# Patient Record
Sex: FEMALE | Race: AMERICAN INDIAN OR ALASKA NATIVE
[De-identification: names, ages, dates, MRNs, and addresses within clinical notes are randomized per-mention and may not be internally consistent; named-entity substitution may affect disease eponyms.]

---

## 2018-06-22 ENCOUNTER — HOSPITAL ENCOUNTER (EMERGENCY)
Dept: HOSPITAL 7 - FB.ED | Age: 68
Discharge: HOME | End: 2018-06-22
Payer: MEDICARE

## 2018-06-22 DIAGNOSIS — S93.402A: Primary | ICD-10-CM

## 2018-06-22 DIAGNOSIS — E66.3: ICD-10-CM

## 2018-06-22 DIAGNOSIS — Z91.041: ICD-10-CM

## 2018-06-22 DIAGNOSIS — F32.9: ICD-10-CM

## 2018-06-22 DIAGNOSIS — I10: ICD-10-CM

## 2018-06-22 DIAGNOSIS — M79.7: ICD-10-CM

## 2018-06-22 DIAGNOSIS — K21.9: ICD-10-CM

## 2018-06-22 DIAGNOSIS — M94.0: ICD-10-CM

## 2018-06-22 DIAGNOSIS — S20.212A: ICD-10-CM

## 2018-06-22 DIAGNOSIS — Z88.8: ICD-10-CM
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
## 2018-06-25 NOTE — ER
DATE SEEN:  06/22/2018

 

CHIEF COMPLAINT:  Fall.

 

HISTORY OF PRESENT ILLNESS:  This 68-year-old  woman presents with a

history of walking a dog.  The dog tripped her and she fell on her left chest

and sprained her left ankle, and has left tib-fib discomfort.

 

She denies shortness of breath or cough or chest pain except for the mild left

axillary region discomfort.  The patient is a 20-pack-year smoker (half a pack

per day for 40 years), has hypertension, status post bilateral bunionectomy,

hammertoe surgery, and compromised left foot MP joint with instability secondary

to surgery, is edentulous, has fibromyalgia, GERD, bladder spasm, denies

drinking alcohol, and has ulcer disease.

 

ALLERGIES:  Gabapentin, IVP dye.

 

MEDICATIONS:  None.

 

REVIEW OF SYSTEMS:  Otherwise negative except as noted in HPI and past medical

history.

 

PHYSICAL EXAMINATION:  VITAL SIGNS:  Blood pressure 116/61, heart rate 93,

respirations 18, oxygen saturation 93% (low normal), and temperature 37.4

degrees centigrade.  The 93% may be her normal as she has chronic obstructive

lung disease from smoking.  BMI is 28.9 kg/m2, 67.1 kg.  GENERAL:  Alert woman,

who is edentulous, is in mild distress.  She is accompanied by daughter.  HEENT:

PERRLA intact.  Pharynx without abnormality except for edentulous appearance.

Uvula and tongue are midline.  Speech appropriate.  NECK:  No bruits.  No

thyromegaly.  No masses in the neck.  No murmurs.  No tracheal tug.  No tracheal

deviation.  LUNGS:  Clear without rales, rhonchi, or wheezes.  HEART:  S1, S2.

No murmur.  Chest wall mild tenderness of the left 11th, 10th, 8th, 7th, and 
6th ribs.

There is reproducible pain at the midclavicular line and the

anterior axillary line and also the sternal chondral joints.  No ecchymosis,

swelling, or laceration noted in the chest wall.  ABDOMEN:  Soft.  No

hepatosplenomegaly.  No guarding.  No abdominal discomfort.  Bowel sounds

normal.  Palpation of the thoracic spine demonstrates  pain from

T3 down to L5.  She states she has fibromyalgia and also states she has chronic

low back and upper back pain, has more upper back pain today than usual.

Today,she attributes the back to her baseline that she always has back pain and

"arthritis."  NEUROLOGIC:  Deep tendon reflexes normal upper and lower

extremities.  Cranial nerves 2 through 12 intact.  Gait appropriate and oriented

x3.

LEFT ANKLE:  there is no swelling of the ankle, there is an abrasion of the 
ankle dermis.  Inversion and

eversion are without abnormality and no abnormality in the drawer sign.  No

proximal 5th metatarsal pain or pain of the metatarsals or phalanges of the

toes.

 

X-ray of her knee, left tib-fib, and left ankle are negative.  No fracture.

There is mild osteoporosis.  The patient noted she has had a DEXA scan last week

and it was a negative number, which means she does have osteoporosis. This 
confirms my

impression of osteoporosis.  Rib x-ray did not reveal fracture.

 

ASSESSMENT:  Soft tissue contusion left ribs with traumatic sternal chondritis,

costochondritis left chest, left ankle sprain and abrasion.

 

 

DIAGNOSES:

1. Left chest wall trauma with mild costochondritis, sternal chondritis, left

    ribs 6th through 11th.

2. Left tib-fib contusion, left ankle sprain, mild and 4 abrasions to the

    lower extremities.  The patient will follow up next week and gradually

    progress or increase activity as tolerated.Because of her age and BEERS

    criteria, no narcotics were given to the patient.  The patient advised the

    reason for not using narcotics.  She felt comfortable with this plan.

    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

 

OTHER DIAGNOSES:

1. Fibromyalgia, edentulous.

2. Mild shortness of breath secondary to left chest contusion.

3. Gastroesophageal reflux disease.

4. Bladder spasm, relieved with oxybutynin.

5. Depression.

6. Hypertension.

7. Mildly overweight.

8. History of gastric bypass and bunion surgery.

 

The patient to follow up with doctor next week.

 

Job#: [id number]

DD: 06/22/2018 1717

DT: 06/23/2018 0029 MAR/WINSTON MALLOY

## 2018-06-25 NOTE — CR
INDICATION:  Fall/trauma left ribs.



LEFT RIBS WITH CHEST:  

CHEST:  PA view of the chest was obtained 06/22/2018 and compared with 09/21/
2009, revealing increased calcification in the arch of the aorta with minimally 
increased tortuosity of the aorta.  The heart remains normal in size and shape.
  



An active infiltrate, effusion, contusion, or pneumothorax was not identified.  



IMPRESSION:  No acute process - progressive ASD aorta.



LEFT RIBS:  Three views of the left ribs were obtained 06/22/2018 and compared 
with 09/21/2009.  



Laterally at the 5th and 6th ribs, there is slight deformity, compatible with 
old healed fracture sites present on the previous examination.  



An acute fracture or other definite bony abnormality of the ribs was not 
identified.  



IMPRESSION:

1.  No acute fracture site identified.

2.  Two old healed fracture sites again noted.  

MTDD

## 2018-06-25 NOTE — CR
INDICATION:  Trauma to leg, dog knocked down.



LEFT TIBIA/FIBULA:  Frontal and lateral views of the left tibia and fibula 
revealed minimal degenerative change at the medial intercondylar spine.



The ankle mortise and knee were otherwise unremarkable with no evidence of a 
fracture or dislocation identified.  

MTDD

## 2020-04-30 NOTE — EDM.PDOC
ED HPI GENERAL MEDICAL PROBLEM





- General


Chief Complaint: Back Pain or Injury


Time Seen by Provider: 04/30/20 12:00


Source of Information: Reports: Patient


History Limitations: Reports: No Limitations





- History of Present Illness


INITIAL COMMENTS - FREE TEXT/NARRATIVE: 





Patient presented to the ED because of a chronic neck and left shoulder pain. 

She has a history of OA and is taking tylneol for pain without any significant 

relief. Her friend gave her tramadol which she took and helped with her pain. 

Her pain is 5/10.


  ** Lower back radiating into R knee, L shoulder


Pain Score (Numeric/FACES): 10





- Related Data


 Allergies











Allergy/AdvReac Type Severity Reaction Status Date / Time


 


gabapentin Allergy  Confusion Verified 04/30/20 12:00


 


Iodinated Contrast Media Allergy  Burning Verified 04/30/20 12:00





[Iodinated Contrast- Oral     





and IV Dye]     











Home Meds: 


 Home Meds





Aspirin 81 mg PO DAILY 06/22/18 [History]


Metoprolol Succinate 25 mg PO DAILY 06/22/18 [History]


busPIRone [Buspar] 5 mg PO TID 06/22/18 [History]


Folic Acid 1 mg DAILY 04/30/20 [History]


Pantoprazole Sodium [Protonix] 40 mg PO BID 04/30/20 [History]


Sucralfate 1 gm QID 04/30/20 [History]


Venlafaxine HCl [Venlafaxine ER] 300 mg DAILY 04/30/20 [History]


tiZANidine 2 mg PO BEDTIME 04/30/20 [History]


tiZANidine 2 mg PO Q8H PRN #30 cap 04/30/20 [Rx]


traMADol HCl [Tramadol HCl] 100 mg PO Q8H PRN #30 tablet 04/30/20 [Rx]











Past Medical History


Cardiovascular History: Reports: Hypertension


Gastrointestinal History: Reports: Gastritis, GERD, GI Bleed, PUD


Genitourinary History: Reports: Urinary Incontinence


Other Genitourinary History: stress incont.


Other OB/GYN History: G0


Musculoskeletal History: Reports: Arthritis, Back Pain, Chronic, Fracture, 

Fibromyalgia, Osteoarthritis


Other Musculoskeletal History: hx fx R forearm & wrist,


Psychiatric History: Reports: Addiction, Anxiety, Depression, Panic Attack


Other Psychiatric History: hx ETOH abuse, recovering x past 4 years


Endocrine/Metabolic History: Reports: Obesity/BMI 30+


Hematologic History: Reports: Anemia, Blood Transfusion(s)





- Infectious Disease History


Infectious Disease History: Reports: Chicken Pox, Measles, Mumps, Shingles





- Past Surgical History


Cardiovascular Surgical History: Reports: None


GI Surgical History: Reports: Appendectomy, Bariatric Procedure, Cholecystectomy

, Colonoscopy, EGD, Hernia Repair/Other


Female  Surgical History: Reports: Hysterectomy, Salpingo-Oophorectomy


Neurological Surgical History: Reports: Spinal Fusion


Other Neurological Surgeries/Procedures: spinal fusion x 2


Musculoskeletal Surgical History: Reports: None, Other (See Below)


Other Musculoskeletal Surgeries/Procedures:: back surgery/fusion x 2





Social & Family History





- Family History


Family Medical History: Noncontributory





- Tobacco Use


Smoking Status *Q: Current Every Day Smoker


Years of Tobacco use: 50


Packs/Tins Daily: 0.2





- Caffeine Use


Caffeine Use: Reports: Coffee, Soda





- Recreational Drug Use


Recreational Drug Use: No





ED ROS GENERAL





- Review of Systems


Review Of Systems: See Below


Constitutional: Reports: No Symptoms


HEENT: Reports: No Symptoms


Respiratory: Reports: No Symptoms


Cardiovascular: Reports: No Symptoms


Endocrine: Reports: No Symptoms


GI/Abdominal: Reports: No Symptoms


: Reports: No Symptoms


Musculoskeletal: Reports: Shoulder Pain, Muscle Stiffness


Skin: Reports: No Symptoms


Neurological: Reports: No Symptoms


Psychiatric: Reports: No Symptoms


Hematologic/Lymphatic: Reports: No Symptoms





ED EXAM,LOWER BACK PAIN/INJURY





- Physical Exam


Exam: See Below


Exam Limited By: No Limitations


General Appearance: Alert, No Apparent Distress


Eye Exam: Bilateral Eye: PERRL


Ears: Normal External Exam, Normal Canal


Nose: Normal Inspection, Normal Mucosa, No Blood


Throat/Mouth: Normal Inspection, Normal Lips, Normal Teeth


Head: Atraumatic, Normocephalic


Neck: Normal Inspection, Supple, Non-Tender, Full Range of Motion


Respiratory/Chest: No Respiratory Distress, Lungs Clear, Normal Breath Sounds


Cardiovascular: Normal Peripheral Pulses, Regular Rate, Rhythm, No Edema, No 

Gallop


GI/Abdominal: Normal Bowel Sounds, Soft, Non-Tender, No Organomegaly


Back Exam: Normal Inspection, Full Range of Motion


Extremities: Normal Inspection, Normal Range of Motion, Other (tenderness left 

shoulder.)





Course





- Vital Signs


Text/Narrative:: 





tramadol 100 mg po x1


tylenol 1000 mg po x1


Last Recorded V/S: 


 Last Vital Signs











Temp  36.9 C   04/30/20 11:55


 


Pulse  67   04/30/20 13:00


 


Resp  18   04/30/20 13:00


 


BP  152/63 H  04/30/20 13:00


 


Pulse Ox  100   04/30/20 13:00














- Orders/Labs/Meds


Meds: 


Medications














Discontinued Medications














Generic Name Dose Route Start Last Admin





  Trade Name Brentq  PRN Reason Stop Dose Admin


 


Acetaminophen  1,000 mg  04/30/20 12:27  04/30/20 12:50





  Tylenol Extra Strength  PO  04/30/20 12:28  1,000 mg





  ONETIME ONE   Administration





     





     





     





     


 


Cyclobenzaprine HCl  10 mg  04/30/20 12:27  04/30/20 12:50





  Flexeril  PO  04/30/20 12:28  10 mg





  ONETIME ONE   Administration





     





     





     





     


 


Tramadol HCl  100 mg  04/30/20 12:27  04/30/20 12:50





  Ultram  PO  04/30/20 12:28  100 mg





  ONETIME ONE   Administration





     





     





     





     














Departure





- Departure


Time of Disposition: 13:30


Disposition: Home, Self-Care 01


Condition: Good


Clinical Impression: 


 Chronic pain, Osteoarthritis








- Discharge Information


Prescriptions: 


tiZANidine 2 mg PO Q8H PRN #30 cap


 PRN Reason: Muscle Spasm


traMADol HCl [Tramadol HCl] 100 mg PO Q8H PRN #30 tablet


 PRN Reason: Pain


Instructions:  Cyclobenzaprine tablets, Osteoarthritis, Tramadol tablets, 

Chronic Pain, Adult


Referrals: 


Enma Shook, NP [Primary Care Provider] - 


Forms:  ED Department Discharge


Additional Instructions: 


please read discharge instructions on chronic pain and osteoarthritis


take the following medicines all at the same time for better pan relief


1.Tizanidine 2mg every 8 hours as needed for muscle spasm


2.Tramadol 100 mg with tylenol 1000 mg every 8 hours as needed for pain


Follow up as needed





Sepsis Event Note





- Evaluation


Sepsis Screening Result: No Definite Risk





- Focused Exam


Vital Signs: 


 Vital Signs











  Temp Pulse Resp BP Pulse Ox


 


 04/30/20 13:00   67  18  152/63 H  100


 


 04/30/20 11:55  36.9 C  50 L  18  133/71  99











Date Exam was Performed: 04/30/20


Time Exam was Performed: 22:55

## 2021-03-03 NOTE — EDM.PDOC
ED HPI GENERAL MEDICAL PROBLEM





- General


Stated Complaint: FLANK PAIN


Time Seen by Provider: 03/03/21 17:10


Source of Information: Reports: Patient


History Limitations: Reports: No Limitations





- History of Present Illness


INITIAL COMMENTS - FREE TEXT/NARRATIVE: 





Patient presented to the ED because of left flank pain which started at 0600. 

The pain is sharp, 10/10. There is no associated nausea,vomiting, fever, chills.

She has a history of kidney stone many years ago and was surgically removed.


  ** left flank pain


Pain Score (Numeric/FACES): 10





- Related Data


                                    Allergies











Allergy/AdvReac Type Severity Reaction Status Date / Time


 


gabapentin Allergy  Confusion Verified 04/30/20 12:00


 


Iodinated Contrast Media Allergy  Burning Verified 04/30/20 12:00





[Iodinated Contrast- Oral     





and IV Dye]     











Home Meds: 


                                    Home Meds





Aspirin 81 mg PO DAILY 06/22/18 [History]


Metoprolol Succinate 25 mg PO DAILY 06/22/18 [History]


busPIRone [Buspar] 5 mg PO TID 06/22/18 [History]


Folic Acid 1 mg DAILY 04/30/20 [History]


Pantoprazole Sodium [Protonix] 40 mg PO BID 04/30/20 [History]


Sucralfate 1 gm QID 04/30/20 [History]


Venlafaxine HCl [Venlafaxine ER] 300 mg DAILY 04/30/20 [History]


tiZANidine 2 mg PO BEDTIME 04/30/20 [History]


tiZANidine 2 mg PO Q8H PRN #30 cap 04/30/20 [Rx]


traMADol HCl [Tramadol HCl] 100 mg PO Q8H PRN #30 tablet 04/30/20 [Rx]











Past Medical History


Cardiovascular History: Reports: Hypertension


Gastrointestinal History: Reports: Gastritis, GERD, GI Bleed, PUD


Genitourinary History: Reports: Urinary Incontinence


Other Genitourinary History: stress incont.


Other OB/GYN History: G0


Musculoskeletal History: Reports: Arthritis, Back Pain, Chronic, Fracture, 

Fibromyalgia, Osteoarthritis


Other Musculoskeletal History: hx fx R forearm & wrist,


Psychiatric History: Reports: Addiction, Anxiety, Depression, Panic Attack


Other Psychiatric History: hx ETOH abuse, recovering x past 4 years


Endocrine/Metabolic History: Reports: Obesity/BMI 30+


Hematologic History: Reports: Anemia, Blood Transfusion(s)





- Infectious Disease History


Infectious Disease History: Reports: Chicken Pox, Measles, Mumps, Shingles





- Past Surgical History


Cardiovascular Surgical History: Reports: None


GI Surgical History: Reports: Appendectomy, Bariatric Procedure, 

Cholecystectomy, Colonoscopy, EGD, Hernia Repair/Other


Female  Surgical History: Reports: Hysterectomy, Salpingo-Oophorectomy


Neurological Surgical History: Reports: Spinal Fusion


Other Neurological Surgeries/Procedures: spinal fusion x 2


Musculoskeletal Surgical History: Reports: None, Other (See Below)


Other Musculoskeletal Surgeries/Procedures:: back surgery/fusion x 2





Social & Family History





- Family History


Family Medical History: No Pertinent Family History





- Caffeine Use


Caffeine Use: Reports: Coffee, Soda





ED ROS GENERAL





- Review of Systems


Review Of Systems: See Below


Constitutional: Reports: No Symptoms


HEENT: Reports: No Symptoms


Respiratory: Reports: No Symptoms


Cardiovascular: Reports: No Symptoms


Endocrine: Reports: No Symptoms


GI/Abdominal: Reports: No Symptoms


: Reports: Flank Pain


Musculoskeletal: Reports: No Symptoms


Skin: Reports: No Symptoms





ED EXAM, GI/ABD





- Physical Exam


Exam: See Below


Exam Limited By: No Limitations


General Appearance: Alert, No Apparent Distress


Ears: Normal External Exam, Normal Canal, Hearing Grossly Normal


Nose: Normal Inspection, Normal Mucosa


Throat/Mouth: Normal Inspection, Normal Lips, Normal Teeth


Head: Atraumatic, Normocephalic


Neck: Normal Inspection, Supple, Non-Tender, Full Range of Motion


Respiratory/Chest: No Respiratory Distress, Lungs Clear, Normal Breath Sounds, 

No Accessory Muscle Use, Chest Non-Tender


Cardiovascular: Normal Peripheral Pulses, Regular Rate, Rhythm, No Edema, No 

Gallop, No JVD, No Murmur


GI/Abdominal Exam: Normal Bowel Sounds, Soft, No Organomegaly, Other (left CVAT)


Back Exam: Normal Inspection, Full Range of Motion


Extremities: Normal Inspection, Normal Range of Motion, Non-Tender, No Pedal 

Edema, Normal Capillary Refill





Course





- Vital Signs


Text/Narrative:: 





Labs/Abd-pelvis CT


NS 1 L bolus


Morphine 2 mg IV x1


Last Recorded V/S: 


                                Last Vital Signs











Temp  37.2 C   03/03/21 17:04


 


Pulse  87   03/03/21 17:04


 


Resp  18   03/03/21 17:04


 


BP  158/76 H  03/03/21 17:04


 


Pulse Ox  100   03/03/21 17:04














- Orders/Labs/Meds


Orders: 


                               Active Orders 24 hr











 Category Date Time Status


 


 CULTURE URINE [RM] Stat Lab  03/03/21 18:43 Ordered


 


 Sodium Chloride 0.9% [Normal Saline] 1,000 ml Med  03/03/21 17:15 Active





 IV ASDIRECTED   


 


 Sodium Chloride 0.9% [Saline Flush] Med  03/03/21 17:11 Active





 10 ml FLUSH ASDIRECTED PRN   


 


 Saline Lock Insert [OM.PC] Routine Oth  03/03/21 17:11 Ordered








                                Medication Orders





Sodium Chloride (Normal Saline)  1,000 mls @ 999 mls/hr IV ASDIRECTED ASHLEY


   Last Admin: 03/03/21 17:34  Dose: 999 mls/hr


   Documented by: [email or web address]


Sodium Chloride (Saline Flush)  10 ml FLUSH ASDIRECTED PRN


   PRN Reason: Keep Vein Open


   Last Admin: 03/03/21 17:35  Dose: 10 ml


   Documented by: [email or web address]








Labs: 


                                Laboratory Tests











  03/03/21 03/03/21 03/03/21 Range/Units





  17:20 17:20 18:20 


 


WBC  5.4    (3.0-10.3)  x10-3/uL


 


RBC  4.24    (3.60-5.20)  x10(6)uL


 


Hgb  11.9    (11.4-15.5)  g/dL


 


Hct  37.8    (34.2-48.2)  %


 


MCV  89.1    (76.7-100.5)  fL


 


MCH  28.0    (23.9-33.9)  pg


 


MCHC  31.5 L    (31.9-34.8)  g/dL


 


RDW  15.0    (12.3-16.5)  %


 


Plt Count  250    (151-488)  x10(3)uL


 


MPV  8.2    (7.1-12.4)  fL


 


Neut % (Auto)  42.3    (30.8-76.2)  %


 


Lymph % (Auto)  44.0    (18.4-52.1)  %


 


Mono % (Auto)  8.7    (4.4-15.7)  %


 


Eos % (Auto)  3.9    (0.6-8.1)  %


 


Baso % (Auto)  1.1    (0.2-1.5)  %


 


Neut # (Auto)  2.3    (1.5-6.3)  x10-3/uL


 


Lymph # (Auto)  2.4    (1.0-4.4)  x10-3/uL


 


Mono # (Auto)  0.5    (0.3-1.0)  x10-3/uL


 


Eos # (Auto)  0.2    (0.0-0.8)  x10-3/uL


 


Baso # (Auto)  0.1    (0.0-0.1)  x10-3/uL


 


Sodium   140   (135-145)  mmol/L


 


Potassium   4.3   (3.5-5.3)  mmol/L


 


Chloride   104   (100-110)  mmol/L


 


Carbon Dioxide   28   (21-32)  mmol/L


 


BUN   15   (7-18)  mg/dL


 


Creatinine   1.1 H   (0.55-1.02)  mg/dL


 


Est Cr Clr Drug Dosing   TNP   


 


Estimated GFR (MDRD)   49 L   (>60)  


 


BUN/Creatinine Ratio   13.6   (9-20)  


 


Glucose   100   ()  mg/dL


 


Calcium   8.9   (8.6-10.2)  mg/dL


 


Urine Color    Yellow  (YELLOW)  


 


Urine Appearance    Clear  (CLEAR)  


 


Urine pH    7.0 H  (5.0-6.5)  


 


Ur Specific Gravity    1.005 L  (1.010-1.025)  


 


Urine Protein    Negative  (NEGATIVE)  mg/dL


 


Urine Glucose (UA)    Normal  (NORMAL)  mg/dL


 


Urine Ketones    Negative  (NEGATIVE)  mg/dL


 


Urine Occult Blood    Negative  (NEGATIVE)  


 


Urine Nitrite    Negative  (NEGATIVE)  


 


Urine Bilirubin    Negative  (NEGATIVE)  


 


Urine Urobilinogen    Normal  (NEGATIVE)  mg/dL


 


Ur Leukocyte Esterase    Moderate H  (NEGATIVE)  


 


Urine RBC    0-5  (0-5)  


 


Urine WBC    0-5  (0-5)  


 


Ur Squamous Epith Cells    Occasional  (NS,R,O)  


 


Urine Bacteria    Few H  (NS)  











Meds: 


Medications











Generic Name Dose Route Start Last Admin





  Trade Name Freq  PRN Reason Stop Dose Admin


 


Sodium Chloride  1,000 mls @ 999 mls/hr  03/03/21 17:15  03/03/21 17:34





  Normal Saline  IV   999 mls/hr





  ASDIRECTED ASHLEY   Administration


 


Sodium Chloride  10 ml  03/03/21 17:11  03/03/21 17:35





  Saline Flush  FLUSH   10 ml





  ASDIRECTED PRN   Administration





  Keep Vein Open  














Discontinued Medications














Generic Name Dose Route Start Last Admin





  Trade Name Freq  PRN Reason Stop Dose Admin


 


Morphine Sulfate  2 mg  03/03/21 17:12  03/03/21 17:34





  Morphine  IVPUSH  03/03/21 17:13  2 mg





  ONETIME ONE   Administration














Departure





- Departure


Time of Disposition: 18:45


Disposition: Home, Self-Care 01


Condition: Good


Clinical Impression: 


 UTI (urinary tract infection), Chronic low back pain, DDD (degenerative disc 

disease)








- Discharge Information


Instructions:  Urinary Tract Infection, Adult, Easy-to-Read


Additional Instructions: 


Please read discharge instructions on UTI and Chronic low back pain


Increase oral fluids


Bactrim DS twice daily for 3 days


Follow up as needed





Sepsis Event Note (ED)





- Focused Exam


Vital Signs: 


                                   Vital Signs











  Temp Pulse Resp BP Pulse Ox


 


 03/03/21 17:04  37.2 C  87  18  158/76 H  100














- My Orders


Last 24 Hours: 


My Active Orders





03/03/21 17:11


Sodium Chloride 0.9% [Saline Flush]   10 ml FLUSH ASDIRECTED PRN 


Saline Lock Insert [OM.PC] Routine 





03/03/21 17:15


Sodium Chloride 0.9% [Normal Saline] 1,000 ml IV ASDIRECTED 





03/03/21 18:43


CULTURE URINE [RM] Stat 














- Assessment/Plan


Last 24 Hours: 


My Active Orders





03/03/21 17:11


Sodium Chloride 0.9% [Saline Flush]   10 ml FLUSH ASDIRECTED PRN 


Saline Lock Insert [OM.PC] Routine 





03/03/21 17:15


Sodium Chloride 0.9% [Normal Saline] 1,000 ml IV ASDIRECTED 





03/03/21 18:43


CULTURE URINE [RM] Stat

## 2021-03-03 NOTE — CT
INDICATION:  Left flank pain.  History of kidney stone.  



CT ABDOMEN AND PELVIS WITHOUT CONTRAST:  Spiral 2.5 mm axial sections were 
obtained through the abdomen and pelvis with sagittal and coronal 
reconstructions without IV contrast 03/03/21 - comparison 10/29/13.  

Total exam DLP was 1088.15 mGy-cm.  



Minimal scarring is again suggested at the lingula with no active infiltrate or 
effusion suggested.  



A moderate size fixed hiatal hernia is again noted with evidence of gastric 
bypass surgery noted.  



The heart did not appear enlarged. 

No pericardial effusion was seen. 



The gallbladder is absent, compatible with history of its removal.  



The liver, adrenal glands, spleen, and pancreas appear to be normal.  



No retroperitoneal mass was noted.  



Evidence of hernia surgery is noted in the lower abdomen/pelvis.  



The appendix is absent, compatible with history of its removal. 



No evidence of free air or bowel obstruction was identified.  



The uterus is absent, compatible with history of its removal. 



Thickening of the wall of the urinary bladder is noted, which may be on the 
basis of cystitis, but should be correlated clinically.  



No definite hernia was identified.  



No evidence of renal calcinosis or obstructive uropathy was identified.  Mild 
renal cortical scarring is noted.  No definite renal mass was demonstrated.  



Fusion is again noted with rods and pedicle screws at L3 through L5 and appears 
fairly stable. 



There is again noted retrolisthesis grade 1-2 at the L2-3 level with sclerosis, 
narrowed disk space and vacuum disk phenomenon again noted and appearing 
moderately progressive.  



Additionally, there is now a new finding of degenerative hypertrophic changes 
with sclerosis, subchondral cystic changes and narrowing of disk space at L1-2 
with vacuum disk phenomenon also noted - progressive osteoarthritis and disk 
disease at L1-2 is also seen.  



No organomegaly, mass lesions or free fluid collections were identified, except 
as noted above.  



IMPRESSION:

1.  Progressive degenerative changes and disk disease L1-2 and L2-3 with stable-
appearing fusion at L3 through L5.  

2.  Thickening of the urinary bladder wall, which may represent cystitis, but 
should be correlated clinically.

3.   Postcholecystectomy, postappendectomy, posthysterectomy, post gastric 
bypass surgery.  

4.  Moderate size fixed hiatal hernia again noted.  

5.  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  

6.  No evidence of obstructive uropathy or renal masses.  However, there is 
evidence of renal cortical scarring at the kidneys, most severe at the upper 
pole posteriorly of the left kidney.  



Report was called to Dr. Salguero at 1758 hours.  

[facility]

## 2022-01-11 NOTE — EDM.PDOC
ED HPI GENERAL MEDICAL PROBLEM





- General


Stated Complaint: HIGH POTASSIUM


Time Seen by Provider: 01/11/22 21:25


Source of Information: Reports: Patient





- History of Present Illness


INITIAL COMMENTS - FREE TEXT/NARRATIVE: 


71-year-old lady was seen by her primary care physician earlier today and found 

to have an elevated potassium at 6.0.  The provider called and let us know that 

she may be coming to the emergency room tonight because of this finding and 

thinks that there may have been some hemolyzed station of the potassium.  She 

states that she has absolutely no acute complaints and specifically denies 

fever, chills, flulike symptoms, chest pain, shortness of breath, arthralgias, 

myalgias, change in bowel or bladder habits.  She does have chronic orthopedic 

pain/complaints.





  ** Generalized


Pain Score (Numeric/FACES): 8





- Related Data


                                    Allergies











Allergy/AdvReac Type Severity Reaction Status Date / Time


 


gabapentin Allergy  Confusion Verified 01/11/22 21:29


 


Iodinated Contrast Media Allergy  Burning Verified 01/11/22 21:29





[Iodinated Contrast- Oral     





and IV Dye]     











Home Meds: 


                                    Home Meds





Aspirin 81 mg PO DAILY 06/22/18 [History]


Metoprolol Succinate 25 mg PO DAILY 06/22/18 [History]


busPIRone [Buspar] 5 mg PO TID 06/22/18 [History]


Folic Acid 1 mg DAILY 04/30/20 [History]


Pantoprazole Sodium [Protonix] 40 mg PO BID 04/30/20 [History]


Sucralfate 1 gm QID 04/30/20 [History]


Venlafaxine HCl [Venlafaxine ER] 300 mg DAILY 04/30/20 [History]


tiZANidine 2 mg PO BEDTIME 04/30/20 [History]


tiZANidine 2 mg PO Q8H PRN #30 cap 04/30/20 [Rx]


traMADol HCl [Tramadol HCl] 100 mg PO Q8H PRN #30 tablet 04/30/20 [Rx]











Past Medical History


Cardiovascular History: Reports: Hypertension


Gastrointestinal History: Reports: Gastritis, GERD, GI Bleed, PUD


Genitourinary History: Reports: Urinary Incontinence, Other (See Below)


Other Genitourinary History: stress incont.


Other OB/GYN History: G0


Musculoskeletal History: Reports: Arthritis, Back Pain, Chronic, Fracture, 

Fibromyalgia, Osteoarthritis


Other Musculoskeletal History: hx fx R forearm & wrist,


Psychiatric History: Reports: Addiction, Anxiety, Depression, Panic Attack


Other Psychiatric History: hx ETOH abuse, recovering x past 4 years


Endocrine/Metabolic History: Reports: Obesity/BMI 30+


Hematologic History: Reports: Anemia, Blood Transfusion(s)





- Infectious Disease History


Infectious Disease History: Reports: Chicken Pox, Measles, Mumps, Shingles





- Past Surgical History


Cardiovascular Surgical History: Reports: None


GI Surgical History: Reports: Appendectomy, Bariatric Procedure, 

Cholecystectomy, Colonoscopy, EGD, Hernia Repair/Other


Female  Surgical History: Reports: Hysterectomy, Salpingo-Oophorectomy


Neurological Surgical History: Reports: Spinal Fusion


Other Neurological Surgeries/Procedures: spinal fusion x 2


Musculoskeletal Surgical History: Reports: None, Other (See Below)


Other Musculoskeletal Surgeries/Procedures:: back surgery/fusion x 2





Social & Family History





- Family History


Family Medical History: No Pertinent Family History





- Caffeine Use


Caffeine Use: Reports: Coffee, Soda





ED ROS GENERAL





- Review of Systems


Review Of Systems: See Below


Constitutional: Reports: No Symptoms


HEENT: Reports: No Symptoms


Respiratory: Reports: No Symptoms


Cardiovascular: Reports: No Symptoms


Endocrine: Reports: No Symptoms


GI/Abdominal: Reports: No Symptoms


: Reports: No Symptoms


Musculoskeletal: Reports: Back Pain, Hand Pain


Skin: Reports: No Symptoms


Neurological: Reports: No Symptoms


Psychiatric: Reports: No Symptoms


Hematologic/Lymphatic: Reports: No Symptoms


Immunologic: Reports: No Symptoms





ED EXAM, GENERAL





- Physical Exam


Exam: See Below


Exam Limited By: No Limitations


General Appearance: Alert, WD/WN, No Apparent Distress


Eye Exam: Bilateral Eye: EOMI


Head: Atraumatic, Normocephalic


Neck: Normal Inspection


Respiratory/Chest: No Respiratory Distress, Lungs Clear


Cardiovascular: Regular Rate, Rhythm


Peripheral Pulses: 2+: Radial (L), Radial (R), Dorsalis Pedis (L), Dorsalis 

Pedis (R)


GI/Abdominal: Normal Bowel Sounds, Non-Tender


Back Exam: Normal Inspection


Extremities: Normal Inspection


Neurological: Alert, Oriented, CN II-XII Intact, Normal Cognition


Psychiatric: Normal Affect, Normal Mood





Course





- Vital Signs


Text/Narrative:: 


Review of EKG shows no acute abnormalities.  Potassium was 3.9.





Last Recorded V/S: 


                                Last Vital Signs











Temp  36.9 C   01/11/22 21:15


 


Pulse  87   01/11/22 21:15


 


Resp  15   01/11/22 21:15


 


BP  157/68 H  01/11/22 21:15


 


Pulse Ox  100   01/11/22 21:15














- Orders/Labs/Meds


Orders: 


                               Active Orders 24 hr











 Category Date Time Status


 


 EKG 12 Lead [EK] Routine Ther  01/11/22 21:21 Ordered











Labs: 


                                Laboratory Tests











  01/11/22 Range/Units





  21:35 


 


Potassium  3.9  (3.5-5.3)  mmol/L














Departure





- Departure


Time of Disposition: 21:52


Disposition: Home, Self-Care 01


Condition: Good


Clinical Impression: 


 Hyperkalemia





Instructions:  Hyperkalemia, Easy-to-Read


Additional Instructions: 


Patient was asymptomatic and came in because of abnormal lab test her primary 

care physician's office.  It looks like her lab test was hemolyzed.  However, 

she is strongly encouraged to follow-up with her primary care physician.





Sepsis Event Note (ED)





- Focused Exam


Vital Signs: 


                                   Vital Signs











  Temp Pulse Resp BP Pulse Ox


 


 01/11/22 21:15  36.9 C  87  15  157/68 H  100














- My Orders


Last 24 Hours: 


My Active Orders





01/11/22 21:21


EKG 12 Lead [EK] Routine 














- Assessment/Plan


Last 24 Hours: 


My Active Orders





01/11/22 21:21


EKG 12 Lead [EK] Routine

## 2022-01-11 NOTE — PCM.EKG
** #1 Interpretation


EKG Date: 01/11/22


Time: 21:22


EKG Interpretation Comments: 


Normal sinus rhythm, likely normal axis, rate 85, early R wave progression, no 

ST-T segment abnormalities in contiguous leads